# Patient Record
Sex: FEMALE | Race: WHITE | URBAN - METROPOLITAN AREA
[De-identification: names, ages, dates, MRNs, and addresses within clinical notes are randomized per-mention and may not be internally consistent; named-entity substitution may affect disease eponyms.]

---

## 2017-01-11 ENCOUNTER — HOSPITAL ENCOUNTER (OUTPATIENT)
Facility: CLINIC | Age: 72
Discharge: HOME | End: 2017-01-11
Attending: OPHTHALMOLOGY | Admitting: OPHTHALMOLOGY
Payer: MEDICARE

## 2017-01-11 ENCOUNTER — ANESTHESIA (OUTPATIENT)
Dept: SURGERY | Facility: CLINIC | Age: 72
End: 2017-01-11
Payer: MEDICARE

## 2017-01-11 ENCOUNTER — ANESTHESIA EVENT (OUTPATIENT)
Dept: SURGERY | Facility: CLINIC | Age: 72
End: 2017-01-11
Payer: MEDICARE

## 2017-01-11 VITALS
RESPIRATION RATE: 16 BRPM | WEIGHT: 293 LBS | OXYGEN SATURATION: 100 % | SYSTOLIC BLOOD PRESSURE: 163 MMHG | HEIGHT: 64 IN | DIASTOLIC BLOOD PRESSURE: 81 MMHG | BODY MASS INDEX: 50.02 KG/M2 | TEMPERATURE: 96.7 F

## 2017-01-11 LAB
GLUCOSE BLDC GLUCOMTR-MCNC: 102 MG/DL (ref 70–99)
GLUCOSE BLDC GLUCOMTR-MCNC: 58 MG/DL (ref 70–99)
GLUCOSE BLDC GLUCOMTR-MCNC: 59 MG/DL (ref 70–99)
GLUCOSE BLDC GLUCOMTR-MCNC: 60 MG/DL (ref 70–99)
GLUCOSE BLDC GLUCOMTR-MCNC: 78 MG/DL (ref 70–99)

## 2017-01-11 PROCEDURE — 25000125 ZZHC RX 250: Performed by: OPHTHALMOLOGY

## 2017-01-11 PROCEDURE — 71000028 ZZH EYE RECOVERY PHASE 2 EACH 15 MINS: Performed by: OPHTHALMOLOGY

## 2017-01-11 PROCEDURE — 25800025 ZZH RX 258: Performed by: ANESTHESIOLOGY

## 2017-01-11 PROCEDURE — 25000125 ZZHC RX 250: Performed by: ANESTHESIOLOGY

## 2017-01-11 PROCEDURE — 25000128 H RX IP 250 OP 636: Performed by: NURSE ANESTHETIST, CERTIFIED REGISTERED

## 2017-01-11 PROCEDURE — 37000008 ZZH ANESTHESIA TECHNICAL FEE, 1ST 30 MIN: Performed by: OPHTHALMOLOGY

## 2017-01-11 PROCEDURE — 25000132 ZZH RX MED GY IP 250 OP 250 PS 637: Mod: GY | Performed by: OPHTHALMOLOGY

## 2017-01-11 PROCEDURE — 27210794 ZZH OR GENERAL SUPPLY STERILE: Performed by: OPHTHALMOLOGY

## 2017-01-11 PROCEDURE — 25000125 ZZHC RX 250: Performed by: NURSE ANESTHETIST, CERTIFIED REGISTERED

## 2017-01-11 PROCEDURE — 36000101 ZZH EYE SURGERY LEVEL 3 1ST 30 MIN: Performed by: OPHTHALMOLOGY

## 2017-01-11 PROCEDURE — 40000170 ZZH STATISTIC PRE-PROCEDURE ASSESSMENT II: Performed by: OPHTHALMOLOGY

## 2017-01-11 PROCEDURE — 82962 GLUCOSE BLOOD TEST: CPT | Mod: 91

## 2017-01-11 RX ORDER — SODIUM CHLORIDE, SODIUM LACTATE, POTASSIUM CHLORIDE, CALCIUM CHLORIDE 600; 310; 30; 20 MG/100ML; MG/100ML; MG/100ML; MG/100ML
500 INJECTION, SOLUTION INTRAVENOUS CONTINUOUS
Status: DISCONTINUED | OUTPATIENT
Start: 2017-01-11 | End: 2017-01-11 | Stop reason: CLARIF

## 2017-01-11 RX ORDER — DEXTROSE MONOHYDRATE 25 G/50ML
25 INJECTION, SOLUTION INTRAVENOUS ONCE
Status: COMPLETED | OUTPATIENT
Start: 2017-01-11 | End: 2017-01-11

## 2017-01-11 RX ORDER — LIDOCAINE HYDROCHLORIDE 10 MG/ML
INJECTION, SOLUTION EPIDURAL; INFILTRATION; INTRACAUDAL; PERINEURAL PRN
Status: DISCONTINUED | OUTPATIENT
Start: 2017-01-11 | End: 2017-01-11 | Stop reason: HOSPADM

## 2017-01-11 RX ORDER — SODIUM CHLORIDE, SODIUM LACTATE, POTASSIUM CHLORIDE, CALCIUM CHLORIDE 600; 310; 30; 20 MG/100ML; MG/100ML; MG/100ML; MG/100ML
500 INJECTION, SOLUTION INTRAVENOUS CONTINUOUS
Status: DISCONTINUED | OUTPATIENT
Start: 2017-01-11 | End: 2017-01-11 | Stop reason: HOSPADM

## 2017-01-11 RX ORDER — TROPICAMIDE 10 MG/ML
1 SOLUTION/ DROPS OPHTHALMIC SEE ADMIN INSTRUCTIONS
Status: COMPLETED | OUTPATIENT
Start: 2017-01-11 | End: 2017-01-11

## 2017-01-11 RX ORDER — MOXIFLOXACIN 5 MG/ML
1 SOLUTION/ DROPS OPHTHALMIC
Status: COMPLETED | OUTPATIENT
Start: 2017-01-11 | End: 2017-01-11

## 2017-01-11 RX ORDER — DICLOFENAC SODIUM 1 MG/ML
1 SOLUTION/ DROPS OPHTHALMIC
Status: COMPLETED | OUTPATIENT
Start: 2017-01-11 | End: 2017-01-11

## 2017-01-11 RX ORDER — ONDANSETRON 2 MG/ML
INJECTION INTRAMUSCULAR; INTRAVENOUS PRN
Status: DISCONTINUED | OUTPATIENT
Start: 2017-01-11 | End: 2017-01-11

## 2017-01-11 RX ORDER — CYCLOPENTOLATE HYDROCHLORIDE 10 MG/ML
1 SOLUTION/ DROPS OPHTHALMIC SEE ADMIN INSTRUCTIONS
Status: COMPLETED | OUTPATIENT
Start: 2017-01-11 | End: 2017-01-11

## 2017-01-11 RX ORDER — BALANCED SALT SOLUTION 6.4; .75; .48; .3; 3.9; 1.7 MG/ML; MG/ML; MG/ML; MG/ML; MG/ML; MG/ML
SOLUTION OPHTHALMIC PRN
Status: DISCONTINUED | OUTPATIENT
Start: 2017-01-11 | End: 2017-01-11 | Stop reason: HOSPADM

## 2017-01-11 RX ORDER — PHENYLEPHRINE HYDROCHLORIDE 25 MG/ML
1 SOLUTION/ DROPS OPHTHALMIC SEE ADMIN INSTRUCTIONS
Status: COMPLETED | OUTPATIENT
Start: 2017-01-11 | End: 2017-01-11

## 2017-01-11 RX ORDER — TETRACAINE HYDROCHLORIDE 5 MG/ML
SOLUTION OPHTHALMIC PRN
Status: DISCONTINUED | OUTPATIENT
Start: 2017-01-11 | End: 2017-01-11 | Stop reason: HOSPADM

## 2017-01-11 RX ADMIN — CYCLOPENTOLATE HYDROCHLORIDE 1 DROP: 10 SOLUTION/ DROPS OPHTHALMIC at 12:16

## 2017-01-11 RX ADMIN — MIDAZOLAM HYDROCHLORIDE 1.5 MG: 1 INJECTION, SOLUTION INTRAMUSCULAR; INTRAVENOUS at 14:35

## 2017-01-11 RX ADMIN — PHENYLEPHRINE HYDROCHLORIDE 1 DROP: 25 SOLUTION/ DROPS OPHTHALMIC at 12:29

## 2017-01-11 RX ADMIN — DICLOFENAC SODIUM 1 DROP: 1 SOLUTION/ DROPS OPHTHALMIC at 12:29

## 2017-01-11 RX ADMIN — TROPICAMIDE 1 DROP: 10 SOLUTION/ DROPS OPHTHALMIC at 12:29

## 2017-01-11 RX ADMIN — MOXIFLOXACIN HYDROCHLORIDE 1 DROP: 5 SOLUTION/ DROPS OPHTHALMIC at 12:22

## 2017-01-11 RX ADMIN — MOXIFLOXACIN HYDROCHLORIDE 1 DROP: 5 SOLUTION/ DROPS OPHTHALMIC at 12:29

## 2017-01-11 RX ADMIN — DEXTROSE MONOHYDRATE 25 ML: 25 INJECTION, SOLUTION INTRAVENOUS at 13:13

## 2017-01-11 RX ADMIN — DEXMEDETOMIDINE 8 MCG: 100 INJECTION, SOLUTION, CONCENTRATE INTRAVENOUS at 14:52

## 2017-01-11 RX ADMIN — TROPICAMIDE 1 DROP: 10 SOLUTION/ DROPS OPHTHALMIC at 12:22

## 2017-01-11 RX ADMIN — SODIUM CHLORIDE, POTASSIUM CHLORIDE, SODIUM LACTATE AND CALCIUM CHLORIDE 500 ML: 600; 310; 30; 20 INJECTION, SOLUTION INTRAVENOUS at 12:17

## 2017-01-11 RX ADMIN — ONDANSETRON 4 MG: 2 INJECTION INTRAMUSCULAR; INTRAVENOUS at 14:42

## 2017-01-11 RX ADMIN — DEXMEDETOMIDINE 8 MCG: 100 INJECTION, SOLUTION, CONCENTRATE INTRAVENOUS at 14:45

## 2017-01-11 RX ADMIN — PHENYLEPHRINE HYDROCHLORIDE 1 DROP: 25 SOLUTION/ DROPS OPHTHALMIC at 12:16

## 2017-01-11 RX ADMIN — TROPICAMIDE 1 DROP: 10 SOLUTION/ DROPS OPHTHALMIC at 12:16

## 2017-01-11 RX ADMIN — CYCLOPENTOLATE HYDROCHLORIDE 1 DROP: 10 SOLUTION/ DROPS OPHTHALMIC at 12:29

## 2017-01-11 RX ADMIN — DICLOFENAC SODIUM 1 DROP: 1 SOLUTION/ DROPS OPHTHALMIC at 12:16

## 2017-01-11 RX ADMIN — DICLOFENAC SODIUM 1 DROP: 1 SOLUTION/ DROPS OPHTHALMIC at 12:22

## 2017-01-11 RX ADMIN — LIDOCAINE HYDROCHLORIDE 1 ML: 10 INJECTION, SOLUTION EPIDURAL; INFILTRATION; INTRACAUDAL; PERINEURAL at 12:17

## 2017-01-11 RX ADMIN — PHENYLEPHRINE HYDROCHLORIDE 1 DROP: 25 SOLUTION/ DROPS OPHTHALMIC at 12:22

## 2017-01-11 RX ADMIN — MOXIFLOXACIN HYDROCHLORIDE 1 DROP: 5 SOLUTION/ DROPS OPHTHALMIC at 12:16

## 2017-01-11 RX ADMIN — MIDAZOLAM HYDROCHLORIDE 0.5 MG: 1 INJECTION, SOLUTION INTRAMUSCULAR; INTRAVENOUS at 14:41

## 2017-01-11 RX ADMIN — DEXMEDETOMIDINE 4 MCG: 100 INJECTION, SOLUTION, CONCENTRATE INTRAVENOUS at 14:49

## 2017-01-11 RX ADMIN — CYCLOPENTOLATE HYDROCHLORIDE 1 DROP: 10 SOLUTION/ DROPS OPHTHALMIC at 12:22

## 2017-01-11 ASSESSMENT — ENCOUNTER SYMPTOMS: DYSRHYTHMIAS: 1

## 2017-01-11 NOTE — IP AVS SNAPSHOT
[facility]    [address]    [place] [address]    Phone:  [phone]    Fax:  [phone]                                       After Visit Summary   1/11/2017    Ailyn Nava    MRN: [id number]           After Visit Summary Signature Page     I have received my discharge instructions, and my questions have been answered. I have discussed any challenges I see with this plan with the nurse or doctor.    ..........................................................................................................................................  Patient/Patient Representative Signature      ..........................................................................................................................................  Patient Representative Print Name and Relationship to Patient    ..................................................               ................................................  Date                                            Time    ..........................................................................................................................................  Reviewed by Signature/Title    ...................................................              ..............................................  Date                                                            Time

## 2017-01-11 NOTE — IP AVS SNAPSHOT
MRN:[id number]                      After Visit Summary   1/11/2017    Ailyn Nava    MRN: [id number]           Thank you!     Thank you for choosing [facility] for your care. Our goal is always to provide you with excellent care. Hearing back from our patients is one way we can continue to improve our services. Please take a few minutes to complete the written survey that you may receive in the mail after you visit with us. Thank you!        Patient Information     Date Of Birth          1945        About your hospital stay     You were admitted on:  January 11, 2017 You last received care in the:  [facility]    You were discharged on:  January 11, 2017       Who to Call     For medical emergencies, please call 911.  For non-urgent questions about your medical care, please call your primary care provider or clinic, [phone]  For questions related to your surgery, please call your surgery clinic        Attending Provider     Provider    Eric Vazquez MD       Primary Care Provider Office Phone # Fax #    Melissa ARCHER Danielson [phone]       [address] [address]        Further instructions from your care team       [facility] Discharge  Instructions  Anesthesia ([facility])   Adult Discharge Instructions    For 24 hours after surgery    1. Get plenty of rest.  Make arrangements to have a responsible adult stay with you for at least 6 hours after you leave the hospital.  2. Do not drive or use heavy equipment for 24 hours.    3. Do not drink alcohol for 24 hours.  4. Do not sign legal documents or make important decisions for 24 hours.  5. Avoid strenuous or risky activities. You may feel lightheaded.  If so, sit for a few minutes before standing.  Have someone help you get up.   6. Conscious sedation patients may resume a regular diet..  7. Any questions of medical  "nature, call your physician.    [facility]  Post Operative Care  Following Cataract Surgery     ? If you have a gauze eye patch on, please do not remove it until it is removed by your physician at your first post-operative visit.    ? You may remove the clear eye shield and begin eye drops when you get home.    ? Wear the eye shield when sleeping for protection until your doctor stops its use.    ? Do not rub the operated eye.    ? Light sensitivity may be noticed. Sunglasses may be worn for comfort.    ? Some discomfort and irritation may be noticed. Acetaminophen (Tylenol) or Ibuprofen (Advil) may be taken for discomfort.    ? For irritation or minor discomfort, you may also use a clean cold washcloth held directly on the eye for 10 minute time periods, as needed.    ? Avoid bending over, strenuous activity or heavy lifting until approved by your doctor.    ? Keep the operated eye dry.    ? You may wash your hair, bathe or shower, but keep the operated eye closed while doing so.    ? Use medication exactly as prescribed by your doctor.  You may restart your regular home medications.    ? Bring all materials and medications to the clinic on your first post-operative visit.    ? Call the doctor s office if any of the following should occur:  -  any sudden vision change  -  nausea or a severe headache  -  increase in pain not controlled  -  increased amount of floaters (black spots in front of vision)  -  or signs of infection (pus, increasing redness or tenderness)              Pending Results     No orders found from 1/10/2017 to 1/12/2017.            Admission Information        Provider Department Dept Phone    1/11/2017 MILES FERRER MD [facility] Preop/Phase [phone]      Your Vitals Were     Blood Pressure Temperature Respirations    152/76 mmHg 96.7  F (35.9  C) (Temporal) 16    Height Weight BMI (Body Mass Index)    1.638 m (5' 4.49\") 145.5 kg (320 lb 12.3 oz) 54.23 kg/m2    Pulse " "Oximetry          98%        MyChart Information     [employer] lets you send messages to your doctor, view your test results, renew your prescriptions, schedule appointments and more. To sign up, go to [email or web address] . Click on \"Log in\" on the left side of the screen, which will take you to the Welcome page. Then click on \"Sign up Now\" on the right side of the page.     You will be asked to enter the access code listed below, as well as some personal information. Please follow the directions to create your username and password.     Your access code is: [email or web address]  Expires: 2017  3:11 PM     Your access code will  in 90 days. If you need help or a new code, please call your [facility] clinic or [phone].        Care EveryWhere ID     This is your Care EveryWhere ID. This could be used by other organizations to access your [facility] medical records  [id number]           Review of your medicines      UNREVIEWED medicines. Ask your doctor about these medicines        Dose / Directions    aspirin 81 MG tablet        Take by mouth daily   Refills:  0       calcium-vitamin D 600-400 MG-UNIT per tablet   Commonly known as:  CALTRATE        Dose:  1 tablet   Take 1 tablet by mouth 2 times daily   Refills:  0       COUMADIN PO        Dose:  5 mg   Take 5 mg by mouth Take 1 - 1.5 tabs by mouth once daily   Refills:  0       GLUCOSAMINE CHOND COMPLEX/MSM PO        Refills:  0       insulin glargine 100 UNIT/ML injection   Commonly known as:  LANTUS        Dose:  70 Units   Inject 70 Units Subcutaneous At Bedtime   Refills:  0       insulin lispro 100 UNIT/ML injection   Commonly known as:  HumaLOG        Inject Subcutaneous 3 times daily (before meals) Dose varies based on carbohydrates   Refills:  0       letrozole 2.5 MG tablet   Commonly known as:  FEMARA        Dose:  2.5 mg   Take 2.5 mg by mouth daily   Refills:  0       LISINOPRIL PO        Dose:  5 mg   Take 5 mg by mouth daily   Refills:  0       " Magnesium Oxide 500 MG Tabs        Dose:  500 mg   Take 500 mg by mouth daily   Refills:  0       nystatin 575199 UNIT/GM Powd   Commonly known as:  MYCOSTATIN        Apply to skin bid   Refills:  0       OMEGA-3 FATTY ACIDS-VITAMIN E PO        2 tablets twice a day   Refills:  0       PROZAC PO        Dose:  20 mg   Take 20 mg by mouth daily   Refills:  0       SYNTHROID PO        Dose:  125 mcg   Take 125 mcg by mouth daily   Refills:  0       TOPROL XL PO        Dose:  50 mg   Take 50 mg by mouth 2 times daily   Refills:  0       WELLBUTRIN XL PO        Dose:  150 mg   Take 150 mg by mouth daily   Refills:  0                Protect others around you: Learn how to safely use, store and throw away your medicines at www.disposemymeds.org.             Medication List: This is a list of all your medications and when to take them. Check marks below indicate your daily home schedule. Keep this list as a reference.      Medications           Morning Afternoon Evening Bedtime As Needed    aspirin 81 MG tablet   Take by mouth daily                                calcium-vitamin D 600-400 MG-UNIT per tablet   Commonly known as:  CALTRATE   Take 1 tablet by mouth 2 times daily                                COUMADIN PO   Take 5 mg by mouth Take 1 - 1.5 tabs by mouth once daily                                GLUCOSAMINE CHOND COMPLEX/MSM PO                                insulin glargine 100 UNIT/ML injection   Commonly known as:  LANTUS   Inject 70 Units Subcutaneous At Bedtime                                insulin lispro 100 UNIT/ML injection   Commonly known as:  HumaLOG   Inject Subcutaneous 3 times daily (before meals) Dose varies based on carbohydrates                                letrozole 2.5 MG tablet   Commonly known as:  FEMARA   Take 2.5 mg by mouth daily                                LISINOPRIL PO   Take 5 mg by mouth daily                                Magnesium Oxide 500 MG Tabs   Take 500 mg by mouth  daily                                nystatin 175938 UNIT/GM Powd   Commonly known as:  MYCOSTATIN   Apply to skin bid                                OMEGA-3 FATTY ACIDS-VITAMIN E PO   2 tablets twice a day                                PROZAC PO   Take 20 mg by mouth daily                                SYNTHROID PO   Take 125 mcg by mouth daily                                TOPROL XL PO   Take 50 mg by mouth 2 times daily                                WELLBUTRIN XL PO   Take 150 mg by mouth daily

## 2017-01-11 NOTE — OR NURSING
Writer spoke with anesthesiologist re:blood sugar after consuming juice and sandwich.  GRACIE approved d/c.

## 2017-01-11 NOTE — ANESTHESIA POSTPROCEDURE EVALUATION
Patient: Ailyn Nava    REPOSITION INTRAOCULAR LENS (Right Eye)  Additional InformationProcedure(s):  RIGHT INTRAOCULAR LENS REPOSITION AND ROTATION - Wound Class: I-Clean    Diagnosis:REPOSITION LENS  Diagnosis Additional Information: No value filed.    Anesthesia Type:  MAC    Note:  Anesthesia Post Evaluation    Patient location during evaluation: PACU  Patient participation: Able to fully participate in evaluation  Level of consciousness: awake  Pain management: adequate  Airway patency: patent  Cardiovascular status: acceptable  Respiratory status: acceptable  Hydration status: acceptable  PONV: none     Anesthetic complications: None          Last vitals:  Filed Vitals:    01/11/17 1224 01/11/17 1501   BP: 170/87 152/76   Temp: 35.9  C (96.7  F)    Resp: 18 16   SpO2: 96% 98%       Electronically Signed By: Danna Alvarado MD, MD  January 11, 2017  3:09 PM

## 2017-01-11 NOTE — OR NURSING
Blood glucose 59 post op. Dr. Alvarado called and aware. Pt eating sandwich and drinking apple juice. Will recheck blood glucose shortly.

## 2017-01-11 NOTE — OR NURSING
Dr. Alvarado updated on pt's most recent blood glucose of 78 and will continue to monitor. No orders at this time.

## 2017-01-11 NOTE — ANESTHESIA CARE TRANSFER NOTE
Patient: Ailyn Nava    REPOSITION INTRAOCULAR LENS (Right Eye)  Additional InformationProcedure(s):  RIGHT INTRAOCULAR LENS REPOSITION AND ROTATION - Wound Class: I-Clean    Diagnosis: REPOSITION LENS  Diagnosis Additional Information: No value filed.    Anesthesia Type:   MAC     Note:  Airway :Room Air  Patient transferred to:Phase II  Comments: VSS on Room Air. Talking and sitting up in chair. Report given to RN before transfer of pt care.      Vitals: (Last set prior to Anesthesia Care Transfer)              Electronically Signed By: CARLITO Fonseca CRNA  January 11, 2017  3:01 PM

## 2017-01-11 NOTE — ANESTHESIA PREPROCEDURE EVALUATION
Anesthesia Evaluation     . Pt has had prior anesthetic.       ROS/MED HX    ENT/Pulmonary:     (+)sleep apnea, uses CPAP , . .    Neurologic:       Cardiovascular:     (+) Dyslipidemia, hypertension----. : . . . :. dysrhythmias (s/p ablation) a-fib, .       METS/Exercise Tolerance:     Hematologic:         Musculoskeletal:         GI/Hepatic:         Renal/Genitourinary:     (+) chronic renal disease,       Endo:     (+) type II DM thyroid problem hypothyroidism, .      Psychiatric:     (+) psychiatric history depression      Infectious Disease:         Malignancy:   (+) Malignancy (no iv or blood pressure in right arm) History of Breast          Other:               Physical Exam  Normal systems: dental    Airway   Mallampati: III  TM distance: >3 FB  Neck ROM: full    Dental     Cardiovascular   Rhythm and rate: regular      Pulmonary    breath sounds clear to auscultation                        Anesthesia Plan      History & Physical Review  History and physical reviewed and following examination; no interval change.    ASA Status:  2 .        Plan for MAC with Intravenous induction. Reason for MAC:  Procedure to face, neck, head or breast  PONV prophylaxis:  Ondansetron       Postoperative Care      Consents  Anesthetic plan, risks, benefits and alternatives discussed with:  Patient..                          .

## 2017-01-11 NOTE — DISCHARGE INSTRUCTIONS
[facility]esthesia [facility] Discharge  Instructions  Anesthesia ([facility])   Adult Discharge Instructions    For 24 hours after surgery    1. Get plenty of rest.  Make arrangements to have a responsible adult stay with you for at least 6 hours after you leave the hospital.  2. Do not drive or use heavy equipment for 24 hours.    3. Do not drink alcohol for 24 hours.  4. Do not sign legal documents or make important decisions for 24 hours.  5. Avoid strenuous or risky activities. You may feel lightheaded.  If so, sit for a few minutes before standing.  Have someone help you get up.   6. Conscious sedation patients may resume a regular diet..  7. Any questions of medical nature, call your physician.    [facility]  Post Operative Care  Following Cataract Surgery     ? If you have a gauze eye patch on, please do not remove it until it is removed by your physician at your first post-operative visit.    ? You may remove the clear eye shield and begin eye drops when you get home.    ? Wear the eye shield when sleeping for protection until your doctor stops its use.    ? Do not rub the operated eye.    ? Light sensitivity may be noticed. Sunglasses may be worn for comfort.    ? Some discomfort and irritation may be noticed. Acetaminophen (Tylenol) or Ibuprofen (Advil) may be taken for discomfort.    ? For irritation or minor discomfort, you may also use a clean cold washcloth held directly on the eye for 10 minute time periods, as needed.    ? Avoid bending over, strenuous activity or heavy lifting until approved by your doctor.    ? Keep the operated eye dry.    ? You may wash your hair, bathe or shower, but keep the operated eye closed while doing so.    ? Use medication exactly as prescribed by your doctor.  You may restart your regular home medications.    ? Bring all materials and medications to the clinic on your first post-operative visit.    ? Call the doctor s office if any  of the following should occur:  -  any sudden vision change  -  nausea or a severe headache  -  increase in pain not controlled  -  increased amount of floaters (black spots in front of vision)  -  or signs of infection (pus, increasing redness or tenderness)

## 2017-01-12 NOTE — OP NOTE
DATE OF PROCEDURE:  2017      PREOPERATIVE DIAGNOSIS:  Status post cataract surgery, right eye with unsatisfactory refractive outcome.      POSTOPERATIVE DIAGNOSIS:  Status post cataract surgery, right eye with unsatisfactory refractive outcome.      PROCEDURE:  Repositioning of intraocular eye well by rotating a toric lens.      COMPLICATIONS:  0.      ANESTHESIA:  Topical with MAC.      DESCRIPTION OF PROCEDURE:  After the right eye was identified and dilated in the preoperative area, the cornea was marked using purpling scribing  marker.  Sepideh Guo was brought to the operating suite where she was positioned and draped in the usual sterile manner.  A standard timeout was performed.  The prior corneal incision was opened using a Femto spatula.  The anterior chamber was filled with viscoelastic, and this was used to hydrodissect the lens free from the anterior capsular adhesions.  After the lens was spinning freely it was positioned at the 35 degree meridian, which was identified by the corneal markings placed on the cornea with a toric marker prior to opening the incision.  The viscoelastic was evacuated using the automated irrigation and aspiration and the corneal wound was hydrated and found to be watertight.  The patient was discharged to the recovery unit in stable condition having tolerated the IOL repositioning procedure well.         MILES FERRER MD             D: 2017 15:03   T: 2017 00:31   MT: [id number]      Name:     SEPIDEH GUO   MRN:      [id number]        Account:        [id number]   :      1945           Procedure Date: 2017      Document: [id number]

## (undated) DEVICE — EYE PACK BVI READYPAK KIT #3

## (undated) DEVICE — EYE SHIELD PLASTIC

## (undated) DEVICE — EYE SOL BSS 500ML

## (undated) DEVICE — EYE PACK CUSTOM ANTERIOR 30DEG TIP CENTURION PPK6682-04

## (undated) DEVICE — GLOVE PROTEXIS MICRO 8.0  2D73PM80

## (undated) DEVICE — EYE KNIFE SLIT XSTAR VISITEC 2.5MM 45DEG BEVEL UP 373725

## (undated) DEVICE — GLOVE PROTEXIS MICRO 7.0  2D73PM70

## (undated) DEVICE — PACK CATARACT CUSTOM SO DALE SEY32CTFCX

## (undated) DEVICE — LINEN TOWEL PACK X5 5464

## (undated) DEVICE — EYE TIP IRRIGATION & ASPIRATION POLYMER 35D BENT 8065751511

## (undated) RX ORDER — LIDOCAINE HYDROCHLORIDE 10 MG/ML
INJECTION, SOLUTION EPIDURAL; INFILTRATION; INTRACAUDAL; PERINEURAL
Status: DISPENSED
Start: 2017-01-11

## (undated) RX ORDER — ONDANSETRON 2 MG/ML
INJECTION INTRAMUSCULAR; INTRAVENOUS
Status: DISPENSED
Start: 2017-01-11

## (undated) RX ORDER — BRIMONIDINE TARTRATE 2 MG/ML
SOLUTION/ DROPS OPHTHALMIC
Status: DISPENSED
Start: 2017-01-11

## (undated) RX ORDER — NEOMYCIN SULFATE, POLYMYXIN B SULFATE, AND DEXAMETHASONE 3.5; 10000; 1 MG/G; [USP'U]/G; MG/G
OINTMENT OPHTHALMIC
Status: DISPENSED
Start: 2017-01-11

## (undated) RX ORDER — DEXTROSE MONOHYDRATE 25 G/50ML
INJECTION, SOLUTION INTRAVENOUS
Status: DISPENSED
Start: 2017-01-11